# Patient Record
Sex: MALE | HISPANIC OR LATINO
[De-identification: names, ages, dates, MRNs, and addresses within clinical notes are randomized per-mention and may not be internally consistent; named-entity substitution may affect disease eponyms.]

---

## 2018-06-13 ENCOUNTER — CHARTING TRANS (OUTPATIENT)
Dept: OTHER | Age: 68
End: 2018-06-13

## 2018-06-19 ENCOUNTER — CHARTING TRANS (OUTPATIENT)
Dept: OTHER | Age: 68
End: 2018-06-19

## 2018-06-30 ENCOUNTER — IMAGING SERVICES (OUTPATIENT)
Dept: OTHER | Age: 68
End: 2018-06-30

## 2018-07-02 ENCOUNTER — CHARTING TRANS (OUTPATIENT)
Dept: OTHER | Age: 68
End: 2018-07-02

## 2018-09-04 ENCOUNTER — ANCILLARY ORDERS (OUTPATIENT)
Dept: OTHER | Age: 68
End: 2018-09-04

## 2018-09-04 DIAGNOSIS — N28.1 ACQUIRED CYST OF KIDNEY: ICD-10-CM

## 2019-01-16 ENCOUNTER — OFFICE VISIT (OUTPATIENT)
Dept: UROLOGY | Age: 69
End: 2019-01-16

## 2019-01-16 ENCOUNTER — LAB SERVICES (OUTPATIENT)
Dept: LAB | Age: 69
End: 2019-01-16

## 2019-01-16 VITALS — WEIGHT: 196 LBS | HEIGHT: 67 IN | BODY MASS INDEX: 30.76 KG/M2 | TEMPERATURE: 97.8 F

## 2019-01-16 DIAGNOSIS — N20.0 NEPHROLITHIASIS: Primary | ICD-10-CM

## 2019-01-16 DIAGNOSIS — Z12.5 PROSTATE CANCER SCREENING: ICD-10-CM

## 2019-01-16 DIAGNOSIS — N28.1 RENAL CYST: ICD-10-CM

## 2019-01-16 LAB — PSA SERPL-MCNC: 0.75 NG/ML (ref 0–4.9)

## 2019-01-16 PROCEDURE — 36415 COLL VENOUS BLD VENIPUNCTURE: CPT | Performed by: UROLOGY

## 2019-01-16 PROCEDURE — G0103 PSA SCREENING: HCPCS | Performed by: UROLOGY

## 2019-01-16 PROCEDURE — 99213 OFFICE O/P EST LOW 20 MIN: CPT | Performed by: UROLOGY

## 2019-01-16 RX ORDER — ATORVASTATIN CALCIUM 10 MG/1
10 TABLET, FILM COATED ORAL
COMMUNITY

## 2019-01-16 RX ORDER — AMLODIPINE BESYLATE 100 %
POWDER (GRAM) MISCELLANEOUS
COMMUNITY

## 2019-01-16 RX ORDER — AMLODIPINE BESYLATE 5 MG/1
5 TABLET ORAL
COMMUNITY

## 2019-01-16 SDOH — HEALTH STABILITY: MENTAL HEALTH: HOW OFTEN DO YOU HAVE A DRINK CONTAINING ALCOHOL?: NEVER

## 2019-01-17 ENCOUNTER — IMAGING SERVICES (OUTPATIENT)
Dept: ULTRASOUND IMAGING | Age: 69
End: 2019-01-17

## 2019-01-17 DIAGNOSIS — N28.1 ACQUIRED CYST OF KIDNEY: ICD-10-CM

## 2019-01-17 PROCEDURE — 76775 US EXAM ABDO BACK WALL LIM: CPT | Performed by: RADIOLOGY

## 2019-03-15 PROBLEM — T75.4XXA: Status: ACTIVE | Noted: 2019-03-15

## 2019-03-15 PROBLEM — M25.611 STIFF SHOULDER, RIGHT: Status: ACTIVE | Noted: 2019-03-15

## 2019-03-15 PROBLEM — M75.101 ROTATOR CUFF SYNDROME OF RIGHT SHOULDER: Status: ACTIVE | Noted: 2019-03-15

## 2019-03-29 PROBLEM — T75.4XXD: Status: ACTIVE | Noted: 2019-03-15

## 2019-04-12 PROBLEM — S46.011D TRAUMATIC COMPLETE TEAR OF RIGHT ROTATOR CUFF, SUBSEQUENT ENCOUNTER: Status: ACTIVE | Noted: 2019-04-12

## 2019-04-17 PROBLEM — S46.011A TRAUMATIC COMPLETE TEAR OF RIGHT ROTATOR CUFF: Status: ACTIVE | Noted: 2019-04-12

## 2019-05-09 RX ORDER — LOSARTAN POTASSIUM 50 MG/1
50 TABLET ORAL DAILY
COMMUNITY

## 2019-05-10 NOTE — H&P
[facility]  Orthopedic Surgery  HISTORY AND PHYSICAL EXAMINATION    Patient: Brandon Christianson  Medical Record Number: [id number]    CHIEF COMPLAINT: Right shoulder pain    HPI:   Claudene Humbles is a 76year old male followed in the office, history of murmurs, rubs or gallops  LUNGS: CTA bilaterally, no rales or wheezes  ABDOMINAL: Soft, no palpable masses. NEURO:  Alert and orientated X3, cranial nerves II-XII intact.   EXTREMITIES: Well-developed well-nourished 26-year-old male, normal gait and statio

## 2019-05-14 ENCOUNTER — ANESTHESIA EVENT (OUTPATIENT)
Dept: SURGERY | Facility: HOSPITAL | Age: 69
End: 2019-05-14
Payer: OTHER MISCELLANEOUS

## 2019-05-14 ENCOUNTER — ANESTHESIA (OUTPATIENT)
Dept: SURGERY | Facility: HOSPITAL | Age: 69
End: 2019-05-14
Payer: OTHER MISCELLANEOUS

## 2019-05-14 ENCOUNTER — HOSPITAL ENCOUNTER (OUTPATIENT)
Facility: HOSPITAL | Age: 69
Setting detail: HOSPITAL OUTPATIENT SURGERY
Discharge: HOME | End: 2019-05-14
Attending: ORTHOPAEDIC SURGERY | Admitting: ORTHOPAEDIC SURGERY
Payer: OTHER MISCELLANEOUS

## 2019-05-14 VITALS
TEMPERATURE: 98 F | WEIGHT: 191.81 LBS | BODY MASS INDEX: 30.1 KG/M2 | HEIGHT: 67 IN | RESPIRATION RATE: 18 BRPM | HEART RATE: 75 BPM | DIASTOLIC BLOOD PRESSURE: 84 MMHG | SYSTOLIC BLOOD PRESSURE: 142 MMHG | OXYGEN SATURATION: 93 %

## 2019-05-14 DIAGNOSIS — S46.011D TRAUMATIC COMPLETE TEAR OF RIGHT ROTATOR CUFF, SUBSEQUENT ENCOUNTER: ICD-10-CM

## 2019-05-14 PROCEDURE — 0LM14ZZ REATTACHMENT OF RIGHT SHOULDER TENDON, PERCUTANEOUS ENDOSCOPIC APPROACH: ICD-10-PCS | Performed by: ORTHOPAEDIC SURGERY

## 2019-05-14 PROCEDURE — 0RNJ4ZZ RELEASE RIGHT SHOULDER JOINT, PERCUTANEOUS ENDOSCOPIC APPROACH: ICD-10-PCS | Performed by: ORTHOPAEDIC SURGERY

## 2019-05-14 PROCEDURE — 3E0T3BZ INTRODUCTION OF ANESTHETIC AGENT INTO PERIPHERAL NERVES AND PLEXI, PERCUTANEOUS APPROACH: ICD-10-PCS | Performed by: STUDENT IN AN ORGANIZED HEALTH CARE EDUCATION/TRAINING PROGRAM

## 2019-05-14 PROCEDURE — 76942 ECHO GUIDE FOR BIOPSY: CPT | Performed by: ORTHOPAEDIC SURGERY

## 2019-05-14 PROCEDURE — 0PB94ZZ EXCISION OF RIGHT CLAVICLE, PERCUTANEOUS ENDOSCOPIC APPROACH: ICD-10-PCS | Performed by: ORTHOPAEDIC SURGERY

## 2019-05-14 DEVICE — SYSTEM ESCP FX 19.1MM 4.75MM: Type: IMPLANTABLE DEVICE | Site: SHOULDER | Status: FUNCTIONAL

## 2019-05-14 DEVICE — ANCHOR SWIVELOCK AR-2324BCC: Type: IMPLANTABLE DEVICE | Site: SHOULDER | Status: FUNCTIONAL

## 2019-05-14 RX ORDER — BUPIVACAINE HYDROCHLORIDE AND EPINEPHRINE 2.5; 5 MG/ML; UG/ML
INJECTION, SOLUTION EPIDURAL; INFILTRATION; INTRACAUDAL; PERINEURAL AS NEEDED
Status: DISCONTINUED | OUTPATIENT
Start: 2019-05-14 | End: 2019-05-14 | Stop reason: HOSPADM

## 2019-05-14 RX ORDER — HYDROMORPHONE HYDROCHLORIDE 1 MG/ML
0.4 INJECTION, SOLUTION INTRAMUSCULAR; INTRAVENOUS; SUBCUTANEOUS EVERY 5 MIN PRN
Status: DISCONTINUED | OUTPATIENT
Start: 2019-05-14 | End: 2019-05-14

## 2019-05-14 RX ORDER — SODIUM CHLORIDE, SODIUM LACTATE, POTASSIUM CHLORIDE, CALCIUM CHLORIDE 600; 310; 30; 20 MG/100ML; MG/100ML; MG/100ML; MG/100ML
INJECTION, SOLUTION INTRAVENOUS CONTINUOUS
Status: DISCONTINUED | OUTPATIENT
Start: 2019-05-14 | End: 2019-05-14

## 2019-05-14 RX ORDER — DIPHENHYDRAMINE HYDROCHLORIDE 50 MG/ML
12.5 INJECTION INTRAMUSCULAR; INTRAVENOUS AS NEEDED
Status: DISCONTINUED | OUTPATIENT
Start: 2019-05-14 | End: 2019-05-14

## 2019-05-14 RX ORDER — HYDROCODONE BITARTRATE AND ACETAMINOPHEN 5; 325 MG/1; MG/1
2 TABLET ORAL AS NEEDED
Status: DISCONTINUED | OUTPATIENT
Start: 2019-05-14 | End: 2019-05-14

## 2019-05-14 RX ORDER — MIDAZOLAM HYDROCHLORIDE 1 MG/ML
1 INJECTION INTRAMUSCULAR; INTRAVENOUS EVERY 5 MIN PRN
Status: DISCONTINUED | OUTPATIENT
Start: 2019-05-14 | End: 2019-05-14

## 2019-05-14 RX ORDER — MEPERIDINE HYDROCHLORIDE 25 MG/ML
12.5 INJECTION INTRAMUSCULAR; INTRAVENOUS; SUBCUTANEOUS AS NEEDED
Status: DISCONTINUED | OUTPATIENT
Start: 2019-05-14 | End: 2019-05-14

## 2019-05-14 RX ORDER — ONDANSETRON 2 MG/ML
4 INJECTION INTRAMUSCULAR; INTRAVENOUS AS NEEDED
Status: DISCONTINUED | OUTPATIENT
Start: 2019-05-14 | End: 2019-05-14

## 2019-05-14 RX ORDER — CEFAZOLIN SODIUM/WATER 2 G/20 ML
2 SYRINGE (ML) INTRAVENOUS ONCE
Status: COMPLETED | OUTPATIENT
Start: 2019-05-14 | End: 2019-05-14

## 2019-05-14 RX ORDER — ACETAMINOPHEN 500 MG
1000 TABLET ORAL ONCE
Status: DISCONTINUED | OUTPATIENT
Start: 2019-05-14 | End: 2019-05-14 | Stop reason: HOSPADM

## 2019-05-14 RX ORDER — NALOXONE HYDROCHLORIDE 0.4 MG/ML
80 INJECTION, SOLUTION INTRAMUSCULAR; INTRAVENOUS; SUBCUTANEOUS AS NEEDED
Status: DISCONTINUED | OUTPATIENT
Start: 2019-05-14 | End: 2019-05-14

## 2019-05-14 RX ORDER — HYDROCODONE BITARTRATE AND ACETAMINOPHEN 5; 325 MG/1; MG/1
1 TABLET ORAL AS NEEDED
Status: DISCONTINUED | OUTPATIENT
Start: 2019-05-14 | End: 2019-05-14

## 2019-05-14 RX ORDER — ACETAMINOPHEN 500 MG
1000 TABLET ORAL EVERY 6 HOURS PRN
COMMUNITY

## 2019-05-14 NOTE — ANESTHESIA POSTPROCEDURE EVALUATION
[address] Patient Status:  Hospital Outpatient Surgery   Age/Gender 76year old male MRN [id number]   [facility] SURGERY Attending Terrence Galdamez MD   Hosp Day # 0 PCP No primary care provider on file.        An

## 2019-05-14 NOTE — BRIEF OP NOTE
Pre-Operative Diagnosis: Traumatic complete tear of right rotator cuff, subsequent encounter [S48.176D]     Post-Operative Diagnosis: * No post-op diagnosis entered *      Procedure Performed:   Procedure(s):  ARTHROSCOPIC ACROMIOPLASTY DISTAL CLAVICLE RES

## 2019-05-14 NOTE — INTERVAL H&P NOTE
Pre-op Diagnosis: Traumatic complete tear of right rotator cuff, subsequent encounter [S46.011D]    The above referenced H&P was reviewed by Jc Grove MD on 5/14/2019, the patient was examined and no significant changes have occurred in the patient'

## 2019-05-14 NOTE — ANESTHESIA PREPROCEDURE EVALUATION
PRE-OP EVALUATION    Patient Name: Brandon Christianson    Pre-op Diagnosis: Traumatic complete tear of right rotator cuff, subsequent encounter [S46.252D]    Procedure(s):  ARTHROSCOPIC ACROMIOPLASTY DISTAL CLAVICLE RESECTION AND ROTATOR CUFF REPAIR RIGHT Airway      Mallampati: II  Mouth opening: >3 FB  TM distance: > 6 cm  Neck ROM: full Cardiovascular    Cardiovascular exam normal.  Rhythm: regular  Rate: normal     Dental    No notable dental history.          Pulmonary    Pulmonary exam normal.  B

## 2019-05-15 PROBLEM — Z47.89 ORTHOPEDIC AFTERCARE: Status: ACTIVE | Noted: 2019-05-15

## 2019-05-15 NOTE — OPERATIVE REPORT
[facility]    PATIENT'S NAME: Isabella Ferguson   ATTENDING PHYSICIAN: Rossy Alcocer M.D. OPERATING PHYSICIAN: Rossy Alcocer M.D.    PATIENT ACCOUNT#:   [de-identified]    LOCATION:  [address] [place] [address]  MEDICAL RECORD #:   [id number] tendon. Mild arthritic change is seen. The scope is transitioned up the subacromial space and a lateral portal is established. Subacromial debridement follows. Hemostasis is achieved. The CA ligament is released with the [employer] probe.   The CA ligamen

## 2019-07-24 PROBLEM — M25.539: Status: ACTIVE | Noted: 2019-07-24

## 2019-07-24 PROBLEM — M25.531 WRIST PAIN, ACUTE, RIGHT: Status: ACTIVE | Noted: 2019-07-24

## 2019-07-24 PROBLEM — M25.539: Status: RESOLVED | Noted: 2019-07-24 | Resolved: 2019-07-24

## 2020-04-29 PROBLEM — M12.811 RIGHT ROTATOR CUFF TEAR ARTHROPATHY: Status: ACTIVE | Noted: 2020-04-29

## 2020-04-29 PROBLEM — M75.101 RIGHT ROTATOR CUFF TEAR ARTHROPATHY: Status: ACTIVE | Noted: 2020-04-29

## 2020-12-17 ENCOUNTER — LABORATORY ENCOUNTER (OUTPATIENT)
Dept: LAB | Facility: HOSPITAL | Age: 70
End: 2020-12-17
Payer: OTHER MISCELLANEOUS

## 2020-12-17 DIAGNOSIS — M75.101 RIGHT ROTATOR CUFF TEAR ARTHROPATHY: ICD-10-CM

## 2020-12-17 DIAGNOSIS — M12.811 RIGHT ROTATOR CUFF TEAR ARTHROPATHY: ICD-10-CM

## 2020-12-17 PROCEDURE — 87081 CULTURE SCREEN ONLY: CPT

## 2020-12-17 PROCEDURE — 86850 RBC ANTIBODY SCREEN: CPT

## 2020-12-17 PROCEDURE — 86901 BLOOD TYPING SEROLOGIC RH(D): CPT

## 2020-12-17 PROCEDURE — 86900 BLOOD TYPING SEROLOGIC ABO: CPT

## 2021-01-07 NOTE — H&P
[facility]  Orthopedic Surgery  HISTORY AND PHYSICAL EXAMINATION    Patient: [address] [place] [address] Pky Record Number: [id number]    CHIEF COMPLAINT: Right shoulder pain    HPI:   Claudene Humbles is a 79year old male followed in the office, ghazal amin nourished, and in no apparent distress\  MENTAL STATUS: Awake, alert, oriented x 3  HEAD/NECK: Head is normocephalic   EYES: EOMI  HEART: RRR, no murmurs, rubs or gallops  LUNGS: CTA bilaterally, no rales or wheezes  ABDOMINAL: Soft, no palpable masses.   Chantell Kelley

## 2021-01-09 ENCOUNTER — LAB ENCOUNTER (OUTPATIENT)
Dept: LAB | Facility: HOSPITAL | Age: 71
End: 2021-01-09
Attending: ORTHOPAEDIC SURGERY
Payer: OTHER MISCELLANEOUS

## 2021-01-09 DIAGNOSIS — M75.101 RIGHT ROTATOR CUFF TEAR ARTHROPATHY: ICD-10-CM

## 2021-01-09 DIAGNOSIS — M12.811 RIGHT ROTATOR CUFF TEAR ARTHROPATHY: ICD-10-CM

## 2021-01-10 LAB — SARS-COV-2 RNA RESP QL NAA+PROBE: NOT DETECTED

## 2021-01-11 ENCOUNTER — ANESTHESIA EVENT (OUTPATIENT)
Dept: SURGERY | Facility: HOSPITAL | Age: 71
End: 2021-01-11
Payer: OTHER MISCELLANEOUS

## 2021-01-11 NOTE — ANESTHESIA PREPROCEDURE EVALUATION
PRE-OP EVALUATION    Patient Name: Shira Putnam    Pre-op Diagnosis: Right rotator cuff tear arthropathy [M75.101, M12.811]    Procedure(s):  RIGHT REVERSE SHOULDER ARTHROPLASTY    Surgeon(s) and Role:     * Linda Lopez MD - Primary    Pre-op and patient meets guidelines. Plan/risks discussed with: patient              Plan for GA with ETT placement, ISB for postoperative analgesia.  A detailed discussion of the risks and benefits of the proposed anesthetic was had in the preoperative a

## 2021-01-12 ENCOUNTER — APPOINTMENT (OUTPATIENT)
Dept: GENERAL RADIOLOGY | Facility: HOSPITAL | Age: 71
End: 2021-01-12
Attending: ORTHOPAEDIC SURGERY
Payer: OTHER MISCELLANEOUS

## 2021-01-12 ENCOUNTER — ANESTHESIA (OUTPATIENT)
Dept: SURGERY | Facility: HOSPITAL | Age: 71
End: 2021-01-12
Payer: OTHER MISCELLANEOUS

## 2021-01-12 ENCOUNTER — HOSPITAL ENCOUNTER (OUTPATIENT)
Facility: HOSPITAL | Age: 71
Discharge: HOME | End: 2021-01-13
Attending: ORTHOPAEDIC SURGERY | Admitting: ORTHOPAEDIC SURGERY
Payer: OTHER MISCELLANEOUS

## 2021-01-12 DIAGNOSIS — M75.101 RIGHT ROTATOR CUFF TEAR ARTHROPATHY: Primary | ICD-10-CM

## 2021-01-12 DIAGNOSIS — M12.811 RIGHT ROTATOR CUFF TEAR ARTHROPATHY: Primary | ICD-10-CM

## 2021-01-12 PROCEDURE — 88311 DECALCIFY TISSUE: CPT | Performed by: ORTHOPAEDIC SURGERY

## 2021-01-12 PROCEDURE — 73020 X-RAY EXAM OF SHOULDER: CPT | Performed by: ORTHOPAEDIC SURGERY

## 2021-01-12 PROCEDURE — 88305 TISSUE EXAM BY PATHOLOGIST: CPT | Performed by: ORTHOPAEDIC SURGERY

## 2021-01-12 PROCEDURE — 0RRJ0JZ REPLACEMENT OF RIGHT SHOULDER JOINT WITH SYNTHETIC SUBSTITUTE, OPEN APPROACH: ICD-10-PCS | Performed by: ORTHOPAEDIC SURGERY

## 2021-01-12 PROCEDURE — 76942 ECHO GUIDE FOR BIOPSY: CPT | Performed by: ANESTHESIOLOGY

## 2021-01-12 RX ORDER — METOCLOPRAMIDE HYDROCHLORIDE 5 MG/ML
INJECTION INTRAMUSCULAR; INTRAVENOUS AS NEEDED
Status: DISCONTINUED | OUTPATIENT
Start: 2021-01-12 | End: 2021-01-12 | Stop reason: SURG

## 2021-01-12 RX ORDER — OXYCODONE HYDROCHLORIDE 5 MG/1
5 TABLET ORAL EVERY 4 HOURS PRN
Status: DISCONTINUED | OUTPATIENT
Start: 2021-01-12 | End: 2021-01-13

## 2021-01-12 RX ORDER — TRANEXAMIC ACID 10 MG/ML
INJECTION, SOLUTION INTRAVENOUS AS NEEDED
Status: DISCONTINUED | OUTPATIENT
Start: 2021-01-12 | End: 2021-01-12 | Stop reason: SURG

## 2021-01-12 RX ORDER — ACETAMINOPHEN 325 MG/1
650 TABLET ORAL ONCE
Status: COMPLETED | OUTPATIENT
Start: 2021-01-12 | End: 2021-01-12

## 2021-01-12 RX ORDER — HYDROMORPHONE HYDROCHLORIDE 1 MG/ML
0.4 INJECTION, SOLUTION INTRAMUSCULAR; INTRAVENOUS; SUBCUTANEOUS EVERY 2 HOUR PRN
Status: DISCONTINUED | OUTPATIENT
Start: 2021-01-12 | End: 2021-01-13

## 2021-01-12 RX ORDER — SODIUM CHLORIDE 9 MG/ML
INJECTION, SOLUTION INTRAVENOUS CONTINUOUS
Status: DISCONTINUED | OUTPATIENT
Start: 2021-01-12 | End: 2021-01-13

## 2021-01-12 RX ORDER — SODIUM CHLORIDE, SODIUM LACTATE, POTASSIUM CHLORIDE, CALCIUM CHLORIDE 600; 310; 30; 20 MG/100ML; MG/100ML; MG/100ML; MG/100ML
INJECTION, SOLUTION INTRAVENOUS CONTINUOUS
Status: DISCONTINUED | OUTPATIENT
Start: 2021-01-12 | End: 2021-01-12 | Stop reason: HOSPADM

## 2021-01-12 RX ORDER — ACETAMINOPHEN 325 MG/1
TABLET ORAL
Status: COMPLETED
Start: 2021-01-12 | End: 2021-01-12

## 2021-01-12 RX ORDER — ZOLPIDEM TARTRATE 5 MG/1
5 TABLET ORAL NIGHTLY PRN
Status: DISCONTINUED | OUTPATIENT
Start: 2021-01-12 | End: 2021-01-13

## 2021-01-12 RX ORDER — SODIUM PHOSPHATE, DIBASIC AND SODIUM PHOSPHATE, MONOBASIC 7; 19 G/133ML; G/133ML
1 ENEMA RECTAL ONCE AS NEEDED
Status: DISCONTINUED | OUTPATIENT
Start: 2021-01-12 | End: 2021-01-13

## 2021-01-12 RX ORDER — ROCURONIUM BROMIDE 10 MG/ML
INJECTION, SOLUTION INTRAVENOUS AS NEEDED
Status: DISCONTINUED | OUTPATIENT
Start: 2021-01-12 | End: 2021-01-12 | Stop reason: SURG

## 2021-01-12 RX ORDER — NEOSTIGMINE METHYLSULFATE 1 MG/ML
INJECTION INTRAVENOUS AS NEEDED
Status: DISCONTINUED | OUTPATIENT
Start: 2021-01-12 | End: 2021-01-12 | Stop reason: SURG

## 2021-01-12 RX ORDER — NALOXONE HYDROCHLORIDE 0.4 MG/ML
80 INJECTION, SOLUTION INTRAMUSCULAR; INTRAVENOUS; SUBCUTANEOUS AS NEEDED
Status: DISCONTINUED | OUTPATIENT
Start: 2021-01-12 | End: 2021-01-12 | Stop reason: HOSPADM

## 2021-01-12 RX ORDER — BISACODYL 10 MG
10 SUPPOSITORY, RECTAL RECTAL
Status: DISCONTINUED | OUTPATIENT
Start: 2021-01-12 | End: 2021-01-13

## 2021-01-12 RX ORDER — OXYCODONE HYDROCHLORIDE 5 MG/1
2.5 TABLET ORAL EVERY 4 HOURS PRN
Status: DISCONTINUED | OUTPATIENT
Start: 2021-01-12 | End: 2021-01-13

## 2021-01-12 RX ORDER — POLYETHYLENE GLYCOL 3350 17 G/17G
17 POWDER, FOR SOLUTION ORAL DAILY PRN
Status: DISCONTINUED | OUTPATIENT
Start: 2021-01-12 | End: 2021-01-13

## 2021-01-12 RX ORDER — CEFAZOLIN SODIUM/WATER 2 G/20 ML
SYRINGE (ML) INTRAVENOUS
Status: DISPENSED
Start: 2021-01-12 | End: 2021-01-12

## 2021-01-12 RX ORDER — GLYCOPYRROLATE 0.2 MG/ML
INJECTION, SOLUTION INTRAMUSCULAR; INTRAVENOUS AS NEEDED
Status: DISCONTINUED | OUTPATIENT
Start: 2021-01-12 | End: 2021-01-12 | Stop reason: SURG

## 2021-01-12 RX ORDER — ONDANSETRON 2 MG/ML
4 INJECTION INTRAMUSCULAR; INTRAVENOUS EVERY 4 HOURS PRN
Status: DISCONTINUED | OUTPATIENT
Start: 2021-01-12 | End: 2021-01-13

## 2021-01-12 RX ORDER — CEFAZOLIN SODIUM/WATER 2 G/20 ML
2 SYRINGE (ML) INTRAVENOUS EVERY 8 HOURS
Status: COMPLETED | OUTPATIENT
Start: 2021-01-12 | End: 2021-01-12

## 2021-01-12 RX ORDER — ONDANSETRON 2 MG/ML
INJECTION INTRAMUSCULAR; INTRAVENOUS AS NEEDED
Status: DISCONTINUED | OUTPATIENT
Start: 2021-01-12 | End: 2021-01-12 | Stop reason: SURG

## 2021-01-12 RX ORDER — ACETAMINOPHEN 500 MG
1000 TABLET ORAL ONCE
Status: DISCONTINUED | OUTPATIENT
Start: 2021-01-12 | End: 2021-01-12

## 2021-01-12 RX ORDER — MEPERIDINE HYDROCHLORIDE 25 MG/ML
12.5 INJECTION INTRAMUSCULAR; INTRAVENOUS; SUBCUTANEOUS AS NEEDED
Status: DISCONTINUED | OUTPATIENT
Start: 2021-01-12 | End: 2021-01-12 | Stop reason: HOSPADM

## 2021-01-12 RX ORDER — TRAMADOL HYDROCHLORIDE 50 MG/1
50 TABLET ORAL EVERY 6 HOURS
Status: DISCONTINUED | OUTPATIENT
Start: 2021-01-12 | End: 2021-01-13

## 2021-01-12 RX ORDER — CEFAZOLIN SODIUM/WATER 2 G/20 ML
2 SYRINGE (ML) INTRAVENOUS ONCE
Status: COMPLETED | OUTPATIENT
Start: 2021-01-12 | End: 2021-01-12

## 2021-01-12 RX ORDER — HYDROMORPHONE HYDROCHLORIDE 1 MG/ML
0.4 INJECTION, SOLUTION INTRAMUSCULAR; INTRAVENOUS; SUBCUTANEOUS EVERY 5 MIN PRN
Status: DISCONTINUED | OUTPATIENT
Start: 2021-01-12 | End: 2021-01-12 | Stop reason: HOSPADM

## 2021-01-12 RX ORDER — DEXAMETHASONE SODIUM PHOSPHATE 10 MG/ML
8 INJECTION, SOLUTION INTRAMUSCULAR; INTRAVENOUS ONCE
Status: COMPLETED | OUTPATIENT
Start: 2021-01-13 | End: 2021-01-13

## 2021-01-12 RX ORDER — HYDROCHLOROTHIAZIDE 25 MG/1
25 TABLET ORAL DAILY
COMMUNITY

## 2021-01-12 RX ORDER — MIDAZOLAM HYDROCHLORIDE 1 MG/ML
1 INJECTION INTRAMUSCULAR; INTRAVENOUS EVERY 5 MIN PRN
Status: DISCONTINUED | OUTPATIENT
Start: 2021-01-12 | End: 2021-01-12 | Stop reason: HOSPADM

## 2021-01-12 RX ORDER — DIPHENHYDRAMINE HYDROCHLORIDE 50 MG/ML
25 INJECTION INTRAMUSCULAR; INTRAVENOUS ONCE AS NEEDED
Status: ACTIVE | OUTPATIENT
Start: 2021-01-12 | End: 2021-01-12

## 2021-01-12 RX ORDER — ACETAMINOPHEN 325 MG/1
650 TABLET ORAL 4 TIMES DAILY
Status: DISCONTINUED | OUTPATIENT
Start: 2021-01-12 | End: 2021-01-13

## 2021-01-12 RX ORDER — KETOROLAC TROMETHAMINE 30 MG/ML
30 INJECTION, SOLUTION INTRAMUSCULAR; INTRAVENOUS EVERY 6 HOURS
Status: COMPLETED | OUTPATIENT
Start: 2021-01-12 | End: 2021-01-13

## 2021-01-12 RX ORDER — LOSARTAN POTASSIUM 50 MG/1
50 TABLET ORAL DAILY
Status: DISCONTINUED | OUTPATIENT
Start: 2021-01-12 | End: 2021-01-13

## 2021-01-12 RX ORDER — SODIUM CHLORIDE, SODIUM LACTATE, POTASSIUM CHLORIDE, CALCIUM CHLORIDE 600; 310; 30; 20 MG/100ML; MG/100ML; MG/100ML; MG/100ML
INJECTION, SOLUTION INTRAVENOUS CONTINUOUS
Status: DISCONTINUED | OUTPATIENT
Start: 2021-01-12 | End: 2021-01-12

## 2021-01-12 RX ORDER — DOCUSATE SODIUM 100 MG/1
100 CAPSULE, LIQUID FILLED ORAL 2 TIMES DAILY
Status: DISCONTINUED | OUTPATIENT
Start: 2021-01-12 | End: 2021-01-13

## 2021-01-12 RX ORDER — PROCHLORPERAZINE EDISYLATE 5 MG/ML
10 INJECTION INTRAMUSCULAR; INTRAVENOUS EVERY 6 HOURS PRN
Status: DISCONTINUED | OUTPATIENT
Start: 2021-01-12 | End: 2021-01-13

## 2021-01-12 RX ORDER — HYDROMORPHONE HYDROCHLORIDE 1 MG/ML
0.2 INJECTION, SOLUTION INTRAMUSCULAR; INTRAVENOUS; SUBCUTANEOUS EVERY 2 HOUR PRN
Status: DISCONTINUED | OUTPATIENT
Start: 2021-01-12 | End: 2021-01-13

## 2021-01-12 RX ORDER — DEXAMETHASONE SODIUM PHOSPHATE 4 MG/ML
VIAL (ML) INJECTION AS NEEDED
Status: DISCONTINUED | OUTPATIENT
Start: 2021-01-12 | End: 2021-01-12 | Stop reason: SURG

## 2021-01-12 RX ADMIN — DEXAMETHASONE SODIUM PHOSPHATE 8 MG: 4 MG/ML VIAL (ML) INJECTION at 07:15:00

## 2021-01-12 RX ADMIN — ROCURONIUM BROMIDE 50 MG: 10 INJECTION, SOLUTION INTRAVENOUS at 07:15:00

## 2021-01-12 RX ADMIN — TRANEXAMIC ACID 1000 MG: 10 INJECTION, SOLUTION INTRAVENOUS at 08:14:00

## 2021-01-12 RX ADMIN — SODIUM CHLORIDE, SODIUM LACTATE, POTASSIUM CHLORIDE, CALCIUM CHLORIDE: 600; 310; 30; 20 INJECTION, SOLUTION INTRAVENOUS at 09:03:00

## 2021-01-12 RX ADMIN — GLYCOPYRROLATE 0.6 MG: 0.2 INJECTION, SOLUTION INTRAMUSCULAR; INTRAVENOUS at 08:50:00

## 2021-01-12 RX ADMIN — NEOSTIGMINE METHYLSULFATE 5 MG: 1 INJECTION INTRAVENOUS at 08:50:00

## 2021-01-12 RX ADMIN — ONDANSETRON 4 MG: 2 INJECTION INTRAMUSCULAR; INTRAVENOUS at 08:50:00

## 2021-01-12 RX ADMIN — METOCLOPRAMIDE HYDROCHLORIDE 10 MG: 5 INJECTION INTRAMUSCULAR; INTRAVENOUS at 07:15:00

## 2021-01-12 RX ADMIN — CEFAZOLIN SODIUM/WATER 2 G: 2 G/20 ML SYRINGE (ML) INTRAVENOUS at 07:24:00

## 2021-01-12 NOTE — CONSULTS
[facility]    History and Physical     [facility] Patient Status:  Outpatient in a Bed    1950 MRN [id number]   [facility] 3SW-A Attending Lizy Nicholson MD   Hosp Day # 0 PCP Tim     Chief Complai Potassium 50 MG Oral Tab, Take 50 mg by mouth daily. , Disp: , Rfl:         Review of Systems:   A comprehensive 14 point review of systems was completed. Pertinent positives and negatives noted in the HPI.     Physical Exam:    /84 (BP Location: Post Operative Pain  -acetaminophen po atc  -toradol iv atc  -tramadol atc  -hydromorphone IV prn  -oxy IR po prn     HTN  -losartan  -hold hctz for now    HLD  -statin on discharge    Quality:  · DVT Prophylaxis: scd  · CODE status: Full per chart    Plan

## 2021-01-12 NOTE — ANESTHESIA POSTPROCEDURE EVALUATION
[address] Patient Status:  Outpatient in a Bed   Age/Gender 79year old male MRN [id number]   Location [address] [facility] Attending Akilah Mao MD   Hosp Day # 0 PCP [address]

## 2021-01-12 NOTE — ANESTHESIA PROCEDURE NOTES
Airway  Urgency: elective      General Information and Staff    Patient location during procedure: OR  Anesthesiologist: Luann Donaldson MD  Performed: anesthesiologist     Indications and Patient Condition  Indications for airway management: anesthesia  Sed

## 2021-01-12 NOTE — INTERVAL H&P NOTE
Pre-op Diagnosis: Right rotator cuff tear arthropathy [M75.101, M12.811]    The above referenced H&P was reviewed by ELISEO Villasenor on 1/12/2021, the patient was examined and no significant changes have occurred in the patient's condition since the H&

## 2021-01-12 NOTE — ANESTHESIA PROCEDURE NOTES
Regional Block  Performed by: Maile Flores MD  Authorized by: Maile Flores MD       General Information and Staff    Start Time:   Anesthesiologist: Maile Flores MD  Performed by:   Anesthesiologist  Patient Location:  OR    Block Placement: Janett Humphrey

## 2021-01-12 NOTE — BRIEF OP NOTE
Pre-Operative Diagnosis: Right rotator cuff tear arthropathy [M75.101, M12.811]     Post-Operative Diagnosis: Right rotator cuff tear arthropathy [M75.101, M12.811]      Procedure Performed:   Procedure(s):  RIGHT REVERSE SHOULDER ARTHROPLASTY    Surgeon(s

## 2021-01-12 NOTE — PLAN OF CARE
NURSING ADMISSION NOTE      Patient admitted via Cart from PACU post right shoulder reverse arthroplasty done by Dr. Hans Ahumada. Oriented to room. Patient received awake, alert and oriented x 4.   Patient primary language is Rwandan but can understand

## 2021-01-13 VITALS
BODY MASS INDEX: 28.74 KG/M2 | HEART RATE: 66 BPM | RESPIRATION RATE: 16 BRPM | HEIGHT: 68 IN | TEMPERATURE: 98 F | SYSTOLIC BLOOD PRESSURE: 116 MMHG | DIASTOLIC BLOOD PRESSURE: 67 MMHG | OXYGEN SATURATION: 92 % | WEIGHT: 189.63 LBS

## 2021-01-13 PROCEDURE — 97165 OT EVAL LOW COMPLEX 30 MIN: CPT

## 2021-01-13 PROCEDURE — 97110 THERAPEUTIC EXERCISES: CPT

## 2021-01-13 RX ORDER — HYDROCHLOROTHIAZIDE 25 MG/1
25 TABLET ORAL DAILY
Status: DISCONTINUED | OUTPATIENT
Start: 2021-01-13 | End: 2021-01-13

## 2021-01-13 NOTE — PLAN OF CARE
Right shoulder incision with aquacel dressing in place. Patient verbalized no pain on assessment.   Dr. Lito Peter seen and examined patient, cleared him for discharge pending OT eval.

## 2021-01-13 NOTE — OPERATIVE REPORT
[facility]    PATIENT'S NAME: Charlee Taylor   ATTENDING PHYSICIAN: Deb Puente M.D. OPERATING PHYSICIAN: Deb Puente M.D.    PATIENT ACCOUNT#:   [de-identified]    LOCATION:  3SW-A [address] [facility]  MEDICAL RECORD #:   [id number]       DATE OF B the lesser tuberosity. The upper half of the subscapularis is compromised. Supraspinatus shows full-thickness tear, atrophy, and attenuation. Elbow it is extended and externally rotated. Evidence of prior repair is seen.   Grade 3 change is seen central

## 2021-01-13 NOTE — PROGRESS NOTES
NURSING DISCHARGE NOTE  [facility] Hospitalist and OT cleared patient for discharge to home. Discharged Home via Wheelchair. Accompanied by Family member  Belongings Taken by patient/family.   Discharge instructions discussed with patient and daughter, both

## 2021-01-13 NOTE — PROGRESS NOTES
[facility]  Orthopedic Surgery  Progress Note    Hong Arndt Patient Status:  Outpatient in a Bed    1950 MRN [id number]   [facility] 3SW-A Attending Heidi Montiel MD   Hosp Day # 0 PCP Tim

## 2021-01-13 NOTE — PLAN OF CARE
A&O x4. VS remain stable. On 2L O2 via NC PRN when sleeping. SCD's on bilaterally. Denies pain to R shoulder. Scheduled PO pain medications given. Shoulder immobilizer and gel ice in place. Microfoam dressing removed per orders. Aquacel dressing CDI.  Jeffrey Jiménez

## 2021-01-13 NOTE — PROGRESS NOTES
[facility] Hospitalist Progress Note                                                                   [address] [facility]  12/1/1950    SUBJECTIVE: No chest pain, palpitations, shortness of tiffany tear arthropathy s/p right reverse shoulder arthroplasty.      Right rotator cuff tear arthropathy  -POD # 1 s/p right reverse shoulder arthroplasty.    -PT/OT--> ok for patient to go home  -ortho following--> ok for discharge     Acute Post Operative Pain

## 2021-01-13 NOTE — OCCUPATIONAL THERAPY NOTE
OCCUPATIONAL THERAPY QUICK EVALUATION - INPATIENT    Room Number: 364/364-A  Evaluation Date: 1/13/2021     Type of Evaluation: Initial & Quick Eval  Presenting Problem: R reverse TSR    Physician Order: IP Consult to Occupational Therapy  Reason for KARINA R [facility] currently need…  -   Putting on and taking off regular lower body clothing?: A Little   -   Bathing (including washing, rinsing, drying)?: A Little  -   Toileting, which includes using toilet, bedpan or urinal? : A Little  -   Putting on and taking off reg deficits impacting engagement in ADL/IADL  LOW   Client Assessment/Performance Deficits LOW   Clinical Decision Making  LOW   Overall Complexity  LOW     OT Discharge Recommendations: Home;Outpatient PT       PLAN   Patient has been evaluated and presents

## 2024-12-29 ENCOUNTER — HOSPITAL ENCOUNTER (EMERGENCY)
Facility: HOSPITAL | Age: 74
Discharge: HOME | End: 2024-12-29
Attending: EMERGENCY MEDICINE
Payer: MEDICARE

## 2024-12-29 ENCOUNTER — APPOINTMENT (OUTPATIENT)
Dept: ULTRASOUND IMAGING | Facility: HOSPITAL | Age: 74
End: 2024-12-29
Attending: EMERGENCY MEDICINE
Payer: MEDICARE

## 2024-12-29 VITALS
SYSTOLIC BLOOD PRESSURE: 158 MMHG | OXYGEN SATURATION: 95 % | BODY MASS INDEX: 30 KG/M2 | DIASTOLIC BLOOD PRESSURE: 93 MMHG | WEIGHT: 194 LBS | RESPIRATION RATE: 18 BRPM | HEART RATE: 62 BPM | TEMPERATURE: 98 F

## 2024-12-29 DIAGNOSIS — M54.31 SCIATICA OF RIGHT SIDE: Primary | ICD-10-CM

## 2024-12-29 PROCEDURE — 99284 EMERGENCY DEPT VISIT MOD MDM: CPT

## 2024-12-29 PROCEDURE — 93971 EXTREMITY STUDY: CPT | Performed by: EMERGENCY MEDICINE

## 2024-12-29 RX ORDER — HYDROCODONE BITARTRATE AND ACETAMINOPHEN 5; 325 MG/1; MG/1
1-2 TABLET ORAL EVERY 4 HOURS PRN
Qty: 20 TABLET | Refills: 0 | Status: SHIPPED | OUTPATIENT
Start: 2024-12-29 | End: 2025-01-05

## 2024-12-29 RX ORDER — METHYLPREDNISOLONE 4 MG/1
TABLET ORAL
Qty: 21 EACH | Refills: 0 | Status: SHIPPED | OUTPATIENT
Start: 2024-12-29

## 2024-12-29 RX ORDER — HYDROCODONE BITARTRATE AND ACETAMINOPHEN 5; 325 MG/1; MG/1
1 TABLET ORAL ONCE
Status: DISCONTINUED | OUTPATIENT
Start: 2024-12-29 | End: 2024-12-29

## 2024-12-29 RX ORDER — HYDROCODONE BITARTRATE AND ACETAMINOPHEN 5; 325 MG/1; MG/1
1 TABLET ORAL ONCE
Status: COMPLETED | OUTPATIENT
Start: 2024-12-29 | End: 2024-12-29

## 2024-12-29 NOTE — ED PROVIDER NOTES
Patient Seen in: [facility] Emergency Department      History     Chief Complaint   Patient presents with    Leg Pain     Stated Complaint: pt daughter states pain going up and down right leg    Subjective:   HPI      74-year-old male reports experiencing pain that starts in the lower back and radiates down the leg. The pain is described as having a \"lightning\" sensation, suggesting nerve involvement. The patient notes difficulty finding a comfortable position and reports that the pain sometimes extends down to the toes. The pain is significant enough to cause discomfort and difficulty in movement. The patient has been taking ibuprofen and a muscle relaxant, but these medications have not provided relief. The pain has been worsening, and the patient is unable to sleep comfortably on either side. There is no history of blood clots.    Objective:     Past Medical History:    Calculus of kidney    High blood pressure    Hyperlipidemia    Osteoarthritis    joints    Visual impairment    prescription glasses              Past Surgical History:   Procedure Laterality Date    Arthroscopy of joint unlisted      Other surgical history      left shoulder scope tendon repair     Other surgical history      right hip scope                 Social History     Socioeconomic History    Marital status: Unknown   Tobacco Use    Smoking status: Never    Smokeless tobacco: Never   Vaping Use    Vaping status: Never Used   Substance and Sexual Activity    Alcohol use: Never    Drug use: Never     Social Drivers of Health      Received from [facility]    Housing Stability                  Physical Exam     ED Triage Vitals [12/29/24 0102]   BP (!) 175/84   Pulse 79   Resp 16   Temp 97.8 °F (36.6 °C)   Temp src Temporal   SpO2 99 %   O2 Device None (Room air)       Current Vitals:   Vital Signs  BP: (!) 158/93  Pulse: 62  Resp: 18  Temp: 97.8 °F (36.6 °C)  Temp src: Temporal    Oxygen Therapy  SpO2: 95 %  O2  Device: None (Room air)        Physical Exam    Vital signs reviewed  General appearance: Patient is alert and in moderate pain distress  HEENT: Pupils equal react to light extraocular muscles intact no scleral icterus, mucous membranes are moist, there is no erythema or exudate in the posterior pharynx  Neck: Supple no JVD no lymphadenopathy no meningismus no carotid bruit  CV: Regular rate and rhythm no murmur rub  Respiratory: Clear to auscultation bilaterally no crackles no wheezes no accessory muscle use  Abdomen: Soft nontender nondistended, no rebound no guarding  no hepatosplenomegaly bowel sounds are present , no pulsatile mass  Extremities: No clubbing cyanosis or edema 2+ distal pulses.  Patient has tenderness in the right buttocks straight leg raise negative but some paresthesias over the lateral tibia good pulses distally  Neuro: Cranial nerves II through XII intact with no gross focal sensory or motor abnormality.      ED Course   Labs Reviewed - No data to display     Based on patient's symptoms it does sound like he does have sciatica as he cannot seem to get comfortable.  Patient will be given some Norco for painAnd will start him on a Medrol Dosepak.  Have him do stretching follow-up with back physician return if worse       US RLE VENOUS      IMPRESSION:  No DVT in the right lower extremity.       MDM   Differential diagnosis reflecting the complexity of care include: Sciatica, DVT, musculoskeletal strain      My independent interpretation of studies of: Ultrasound unremarkable no DVT noted    Diagnostic tests and medications considered but not ordered were: I of the lumbar spine but feel this can be done as an outpatient      Shared decision making was done by discharged home with pain medication and follow-up with spine.  Return if worse.    Patient was screened and evaluated during this visit.  As the treating physician attending to the patient, I determined within reasonable clinical  confidence and prior to discharge, that an emergency medical condition was not or was no longer present.  There was no indication for further evaluation, treatment, or admission on an emergency basis.  Comprehensive verbal and written discharge and follow-up instructions were provided to help prevent relapse or worsening.  Patient was instructed to follow-up with primary care provider for further evaluation treatment, return immediately to ER for worsening, concerning, new, or changing/persisting symptoms.  I discussed the case with the patient and they had no questions, complaints, or concerns.  Patient was comfortable going home.      Dictation Disclaimer Note:   To increase efficiency this document may have been prepared using voice recognition technology. Every effort has been made to correct any errors made during preparation of this note. However, if a word or phrase is confusing, or does not make sense, this is likely due to a recognition error within the program which was not discovered during editing. Please do not hesitate to contact to address any significant errors.    Note to Patient:   The 21st Century Cures Act makes medical notes like these available to patients in the interest of transparency. Please be advised this is a medical document. Medical documents are intended to carry relevant information, facts as evident, and the clinical opinion of the practitioner. The medical note is intended as peer to peer communication and may appear blunt or direct. It is written in medical language and may contain abbreviations or verbiage that are unfamiliar.           Medical Decision Making      Disposition and Plan     Clinical Impression:  1. Sciatica of right side         Disposition:  Discharge  12/29/2024  3:37 am    Follow-up:  August Quigley  [address] [facility] [address]  [phone]    Follow up            Medications Prescribed:  Discharge Medication List as of 12/29/2024  3:56 AM         START taking these medications    Details   HYDROcodone-acetaminophen 5-325 MG Oral Tab Take 1-2 tablets by mouth every 4 (four) hours as needed for Pain., Normal, Disp-20 tablet, R-0      methylPREDNISolone 4 MG Oral Tablet Therapy Pack Dosepack: use as directed on packaging, Normal, Disp-21 each, R-0                 Supplementary Documentation:

## (undated) DEVICE — SUTURE CHROMIC GUT 0 CT-1

## (undated) DEVICE — GLENOID CALIBRATOR

## (undated) DEVICE — KIT TRC TRIMANO BEACH CHR ARM

## (undated) DEVICE — CHLORAPREP 26ML APPLICATOR

## (undated) DEVICE — 10K/24K ARTHROSCOPY INFLOW TUBE SET: Brand: 10K/24K

## (undated) DEVICE — DRAPE,U/SHT,SPLIT,FILM,60X84,STERILE: Brand: MEDLINE

## (undated) DEVICE — SHOULDER TRACTION KIT AR-1635

## (undated) DEVICE — GOWN,SIRUS,FABRIC-REINFORCED,X-LARGE: Brand: MEDLINE

## (undated) DEVICE — ABDOMINAL PAD: Brand: DERMACEA

## (undated) DEVICE — 3M™ MICROFOAM™ TAPE 1528-4: Brand: 3M™ MICROFOAM™

## (undated) DEVICE — SUTURE VICRYL 2-0 FSL

## (undated) DEVICE — Device

## (undated) DEVICE — STERILE POLYISOPRENE POWDER-FREE SURGICAL GLOVES: Brand: PROTEXIS

## (undated) DEVICE — SHOULDER IMMOBILIZER

## (undated) DEVICE — 3M™ IOBAN™ 2 ANTIMICROBIAL INCISE DRAPE 6650EZ: Brand: IOBAN™ 2

## (undated) DEVICE — CANNULA PASSPORT AR-6592-08-40

## (undated) DEVICE — 4.5 MM FULL RADIUS ELITE STRAIGHT                                    DISPOSABLE BLADES, MAROON,PACKAGED 6                                    PER BOX, STERILE

## (undated) DEVICE — SUTURETAPE FIBERLINK 1.3MM WH/BL

## (undated) DEVICE — Device: Brand: STABLECUT®

## (undated) DEVICE — ORTHO CDS-LF: Brand: MEDLINE INDUSTRIES, INC.

## (undated) DEVICE — KIT ASCP FX PUSHLOCK LOPRFL

## (undated) DEVICE — SUPER SPONGES,MEDIUM: Brand: KERLIX

## (undated) DEVICE — SUTURE FIBERWIRE 2 AR-7202

## (undated) DEVICE — NEEDLE SCORPION AR-13995N

## (undated) DEVICE — BLADE ELECTRODE: Brand: EDGE

## (undated) DEVICE — SUTURE ETHILON 4-0 FS-1

## (undated) DEVICE — SOL LACT RINGERS 3000ML

## (undated) DEVICE — PIN KIT

## (undated) DEVICE — CANNULA 5.75 CLEAR AR-6560

## (undated) DEVICE — DRESSING AQUACEL AG 3.5 X 10

## (undated) DEVICE — PAD POLAR CARE KNEE/SHOULDER

## (undated) DEVICE — LIGHT HANDLE

## (undated) DEVICE — WRAP COOLING SHLDR W/ICE PILLO

## (undated) DEVICE — STERILE POLYISOPRENE POWDER-FREE SURGICAL GLOVES WITH EMOLLIENT COATING: Brand: PROTEXIS

## (undated) DEVICE — SOL  .9 1000ML BTL

## (undated) DEVICE — PROXIMATE SKIN STAPLERS (35 WIDE) CONTAINS 35 STAINLESS STEEL STAPLES (FIXED HEAD): Brand: PROXIMATE

## (undated) DEVICE — PREMIUM WET SKIN PREP TRAY: Brand: MEDLINE INDUSTRIES, INC.

## (undated) DEVICE — OCCLUSIVE GAUZE STRIP OVERWRAP,3% BISMUTH TRIBROMOPHENATE IN PETROLATUM BLEND: Brand: XEROFORM

## (undated) DEVICE — KENDALL SCD EXPRESS SLEEVES, KNEE LENGTH, MEDIUM: Brand: KENDALL SCD

## (undated) DEVICE — INTENDED TO AID IN THE PASSING OF SUTURES THROUGH BONE AND SOFT TISSUE DURING ORTHOPEDIC SURGERY: Brand: HOFFEE SUTURE RETRIEVER

## (undated) DEVICE — 4.5 MM HELICUT STRAIGHT BURRS,                                    POWER/EP-1, SLATE, 5000 MAXIMUM RPM,                                    PACKAGED 6 PER BOX, STERILE: Brand: DYONICS HELICUT

## (undated) DEVICE — POLAR CARE CUBE COOLING UNIT

## (undated) DEVICE — SHOULDER ARTHROSCOPY CDS-LF: Brand: MEDLINE INDUSTRIES, INC.

## (undated) NOTE — LETTER
DECLINATION FORM    [address]/[facility] provides interpreters for patients who are deaf, hearing impaired, or have Limited  [employer] [facility] Proficiency in order to ensure thee patients an equal opportunity to benefit from services.  There Patient Name: Miguelina Conklin     : 1950    Page 1 of 1     Printed: @DATE      Medical Record #: [id number]   Revised (03)

## (undated) NOTE — LETTER
[facility] Testing Department  Phone: [phone] TESTING RESULT REQUEST      TO:   Franck Decker Record Department  Today's Date: 5/9/19     ATTN : Pawel Vega or Wili Tejada                                                 STAT/URGENT  FA

## (undated) NOTE — LETTER
[facility] [address], [address]    Consentimiento para Bing Ades:  Hora:     1.  Autorizo la realización en [employer] la siguiente cirugía:    Procedure(s):  ARTHROSCOPIC ACROMIOPLASTY DISTAL CLAVICLE RESECTION identidad no sea revelada en las imágenes o textos descriptivos que los acompañan. Si el procedimiento se ha [employer], el cirujano obtendrá el video original. El hospital no será responsable del almacenamiento o mantenimiento de esta grabación. para fines de restablecer la orden de No resucitar.     CERTIFICO QUE HE LEÍDO Y ENTENDIDO EL CONSENTIMIENTO PARA CIRUGÍA ANTERIOR, QUE MI MÉDICO ME PROPORCIONÓ LAS EXPLICACIONES ANTERIORES, QUE TODOS LOS ESPACIOS EN BHAGAT, O LAS AFIRMACIONES QUE REQUIEREN aliza Fuchs a mi anestesista antes del procedimiento:  · Si estoy embarazada. · La última vez que comí o bebí algo.   · [employer] medicamentos que yarelis (incluyendo medicamentos recetados, suplementos herbales y pastillas que puedo comprar sin receta Perry Puedo cambiar de opinión acerca de recibir servicios de anestesia en cualquier momento antes de que se me administre el medicamento.      _______________________________________________________       ____________________       _________    Bill Camp (o repr

## (undated) NOTE — LETTER
[facility] Testing Department  Phone: [phone]  OUTSIDE TESTING RESULT REQUEST      TO:   Dr. Nader Piña.  [facility] [place] Date: 12/16/20    FAX #: [phone]     IMPORTANT: FOR YOUR IMMEDIATE ATTENTION  Please FAX all test results listed below to: 6